# Patient Record
Sex: MALE | Race: WHITE | NOT HISPANIC OR LATINO | Employment: FULL TIME | URBAN - METROPOLITAN AREA
[De-identification: names, ages, dates, MRNs, and addresses within clinical notes are randomized per-mention and may not be internally consistent; named-entity substitution may affect disease eponyms.]

---

## 2017-08-27 ENCOUNTER — APPOINTMENT (OUTPATIENT)
Dept: RADIOLOGY | Facility: MEDICAL CENTER | Age: 27
End: 2017-08-27
Attending: EMERGENCY MEDICINE
Payer: COMMERCIAL

## 2017-08-27 ENCOUNTER — HOSPITAL ENCOUNTER (EMERGENCY)
Facility: MEDICAL CENTER | Age: 27
End: 2017-08-27
Attending: EMERGENCY MEDICINE
Payer: COMMERCIAL

## 2017-08-27 VITALS
HEART RATE: 76 BPM | OXYGEN SATURATION: 98 % | TEMPERATURE: 99.5 F | DIASTOLIC BLOOD PRESSURE: 84 MMHG | SYSTOLIC BLOOD PRESSURE: 123 MMHG | BODY MASS INDEX: 24.31 KG/M2 | HEIGHT: 66 IN | WEIGHT: 151.24 LBS | RESPIRATION RATE: 16 BRPM

## 2017-08-27 DIAGNOSIS — R10.11 RUQ PAIN: ICD-10-CM

## 2017-08-27 LAB
ALBUMIN SERPL BCP-MCNC: 4.6 G/DL (ref 3.2–4.9)
ALBUMIN/GLOB SERPL: 1.6 G/DL
ALP SERPL-CCNC: 58 U/L (ref 30–99)
ALT SERPL-CCNC: 22 U/L (ref 2–50)
ANION GAP SERPL CALC-SCNC: 9 MMOL/L (ref 0–11.9)
AST SERPL-CCNC: 18 U/L (ref 12–45)
BASOPHILS # BLD AUTO: 0.5 % (ref 0–1.8)
BASOPHILS # BLD: 0.03 K/UL (ref 0–0.12)
BILIRUB SERPL-MCNC: 2.1 MG/DL (ref 0.1–1.5)
BUN SERPL-MCNC: 13 MG/DL (ref 8–22)
CALCIUM SERPL-MCNC: 9.6 MG/DL (ref 8.5–10.5)
CHLORIDE SERPL-SCNC: 107 MMOL/L (ref 96–112)
CO2 SERPL-SCNC: 25 MMOL/L (ref 20–33)
CREAT SERPL-MCNC: 0.77 MG/DL (ref 0.5–1.4)
EOSINOPHIL # BLD AUTO: 0.02 K/UL (ref 0–0.51)
EOSINOPHIL NFR BLD: 0.3 % (ref 0–6.9)
ERYTHROCYTE [DISTWIDTH] IN BLOOD BY AUTOMATED COUNT: 41 FL (ref 35.9–50)
GFR SERPL CREATININE-BSD FRML MDRD: >60 ML/MIN/1.73 M 2
GLOBULIN SER CALC-MCNC: 2.9 G/DL (ref 1.9–3.5)
GLUCOSE SERPL-MCNC: 114 MG/DL (ref 65–99)
HCT VFR BLD AUTO: 43.8 % (ref 42–52)
HGB BLD-MCNC: 15 G/DL (ref 14–18)
IMM GRANULOCYTES # BLD AUTO: 0.02 K/UL (ref 0–0.11)
IMM GRANULOCYTES NFR BLD AUTO: 0.3 % (ref 0–0.9)
LIPASE SERPL-CCNC: 7 U/L (ref 11–82)
LYMPHOCYTES # BLD AUTO: 2.47 K/UL (ref 1–4.8)
LYMPHOCYTES NFR BLD: 37.8 % (ref 22–41)
MCH RBC QN AUTO: 29.6 PG (ref 27–33)
MCHC RBC AUTO-ENTMCNC: 34.2 G/DL (ref 33.7–35.3)
MCV RBC AUTO: 86.4 FL (ref 81.4–97.8)
MONOCYTES # BLD AUTO: 0.64 K/UL (ref 0–0.85)
MONOCYTES NFR BLD AUTO: 9.8 % (ref 0–13.4)
NEUTROPHILS # BLD AUTO: 3.36 K/UL (ref 1.82–7.42)
NEUTROPHILS NFR BLD: 51.3 % (ref 44–72)
NRBC # BLD AUTO: 0 K/UL
NRBC BLD AUTO-RTO: 0 /100 WBC
PLATELET # BLD AUTO: 307 K/UL (ref 164–446)
PMV BLD AUTO: 10.5 FL (ref 9–12.9)
POTASSIUM SERPL-SCNC: 3.8 MMOL/L (ref 3.6–5.5)
PROT SERPL-MCNC: 7.5 G/DL (ref 6–8.2)
RBC # BLD AUTO: 5.07 M/UL (ref 4.7–6.1)
SODIUM SERPL-SCNC: 141 MMOL/L (ref 135–145)
WBC # BLD AUTO: 6.5 K/UL (ref 4.8–10.8)

## 2017-08-27 PROCEDURE — 85025 COMPLETE CBC W/AUTO DIFF WBC: CPT

## 2017-08-27 PROCEDURE — 76705 ECHO EXAM OF ABDOMEN: CPT

## 2017-08-27 PROCEDURE — 80053 COMPREHEN METABOLIC PANEL: CPT

## 2017-08-27 PROCEDURE — 99283 EMERGENCY DEPT VISIT LOW MDM: CPT

## 2017-08-27 PROCEDURE — 83690 ASSAY OF LIPASE: CPT

## 2017-08-27 RX ORDER — PANTOPRAZOLE SODIUM 40 MG/1
40 FOR SUSPENSION ORAL DAILY
COMMUNITY

## 2017-08-27 RX ORDER — FAMOTIDINE 20 MG/1
20 TABLET, FILM COATED ORAL
COMMUNITY

## 2017-08-27 ASSESSMENT — PAIN SCALES - GENERAL: PAINLEVEL_OUTOF10: 2

## 2017-08-27 NOTE — ED NOTES
"Pt states that he has hx of GERD, states that his PMD placed him on pantoprazole, states that he started having yellow stools and constipation, states that he has not been constipated over last two days but continues to have yellow stools, pt states that he is not really hurting in RUQ \"it's just a little tender\", pt ambulated to room with strong steady gait in NAD at this time.  "

## 2017-08-27 NOTE — ED PROVIDER NOTES
ED Provider Note    Scribed for Ramos Agee M.D. by Amanda Hernandez. 8/27/2017, 4:50 PM.    Primary care provider: None noted.   Means of arrival: Walk-in   History obtained from: Patient   History limited by: None     CHIEF COMPLAINT  Chief Complaint   Patient presents with   • RUQ Pain     on and off since friday, denies n/v/d or cold chills       HPI  Stewart Fernandez is a 26 y.o. male who presents to the Emergency Department due to worsening intermittent right upper quadrant pain onset 2 days ago. His pain is described as a dull sensation and is rated as a 2/10 in severity. Patient was evaluated due to headaches and GERD symptoms previously, and he was given pantoprazole, Tylenol, and ibuprofen. He notes a history of acid reflux, GERD, and liver issues. He started using the pantoprazole and began experiencing yellow stools and intermittent constipation. He denies yellowing to the eyes or skin. Patient notes mild intermittent blurry vision at onset of his headache symptoms. He is a former smoker. He notes drinking 2 to 3 beers or glasses of wine each night with dinner, Sometimes more. He notes prior occasional illicit drug use, although he has not used any drugs for several months. Patient denies fever, chills, nausea, or vomiting.     REVIEW OF SYSTEMS  See HPI for further details. All other systems negative. C     PAST MEDICAL HISTORY   has a past medical history of GERD (gastroesophageal reflux disease).    SURGICAL HISTORY  patient denies any surgical history    SOCIAL HISTORY  Social History   Substance Use Topics   • Smoking status: Former Smoker     Start date: 6/27/2017   • Smokeless tobacco: Never Used   • Alcohol use Yes      Comment: 5 out of 7 days      History   Drug Use No       FAMILY HISTORY  History reviewed. No pertinent family history.    CURRENT MEDICATIONS  Home Medications     Reviewed by Tita Jimenez R.N. (Registered Nurse) on 08/27/17 at 1600  Med List Status: Complete   Medication  "Last Dose Status   famotidine (PEPCID) 20 MG Tab 8/26/2017 Active   pantoprazole (PROTONIX) 40 MG Pack 8/27/2017 Active                ALLERGIES  No Known Allergies    PHYSICAL EXAM  VITAL SIGNS: /80   Pulse 83   Temp 37.5 °C (99.5 °F) (Temporal)   Resp 14   Ht 1.676 m (5' 6\")   Wt 68.6 kg (151 lb 3.8 oz)   SpO2 96%   BMI 24.41 kg/m²   Vitals reviewed.  Pulse ox interpretation: I interpret this pulse ox as normal.  Constitutional: Alert in no apparent distress.  HENT: No signs of trauma, Bilateral external ears normal, Nose normal.   Eyes: Pupils are equal and reactive, Conjunctiva normal, Non-icteric.   Neck: Normal range of motion, No tenderness, Supple, No stridor.   Lymphatic: No lymphadenopathy noted.   Cardiovascular: Regular rate and rhythm, no murmurs.   Thorax & Lungs: Normal breath sounds, No respiratory distress, No wheezing, No chest tenderness.   Abdomen: Bowel sounds normal, Soft, Tender to palpation of the right upper quadrant, Negative Espinoza'sSign, No masses, No pulsatile masses. No peritoneal signs.  Skin: Warm, Dry, No erythema, No rash.   Back: No bony tenderness, No CVA tenderness.   Extremities: Intact distal pulses, No edema, No tenderness, No cyanosis.  Musculoskeletal: Good range of motion in all major joints. No tenderness to palpation or major deformities noted.   Neurologic: Alert , Normal motor function, Normal sensory function, No focal deficits noted.   Psychiatric: Affect normal, Judgment normal, Mood normal.     DIAGNOSTIC STUDIES / PROCEDURES    LABS  Labs Reviewed   COMP METABOLIC PANEL - Abnormal; Notable for the following:        Result Value    Glucose 114 (*)     Total Bilirubin 2.1 (*)     All other components within normal limits   LIPASE - Abnormal; Notable for the following:     Lipase 7 (*)     All other components within normal limits   CBC WITH DIFFERENTIAL   ESTIMATED GFR    All labs reviewed by me.    RADIOLOGY  US-LIVER AND BILIARY TREE   Final Result    "   1.  No evidence of gallstone or biliary ductal dilatation.      2.  3 mm gallbladder polyp.      3.  The liver is echogenic consistent with fatty change versus hepatocellular dysfunction.      The radiologist's interpretation of all radiological studies have been reviewed by me.    COURSE & MEDICAL DECISION MAKING  Nursing notes, GUERDA HOOVERHx reviewed in chart.  This is a 26-year-old male here with right upper quadrant pain. Differential diagnoses include but not limited to: gastritis, constipation, viral hepatitis, alcoholic hepatitis, medication side effect, cholecystitis, choledocholithiasis, pancreatitis.     4:50 PM Patient seen and examined at bedside. Patient appears well-hydrated and nontoxic. Physical exam is remarkable for mild tenderness to palpation in the right upper quadrant with a negative Espinoza's sign. Ordered for ultrasound liver and biliary tree, CBC with differential, CMP, lipase, and estimated GFR to evaluate.     Patient's labs reveal mildly elevated bilirubin to 2.1 which is unlikely to be clinically significant. Remainder of labs are unremarkable.    7:19 PM Recheck: Patient is resting comfortably and reports feeling improved. I updated him on his results, which are indicated above. There is no evidence of gallstones. I explained that he is now stable for discharge home. I advised him to follow up with his primary care provider as soon as he returns home to [place] and to return to the ED for worsening symptoms. I also discussed considering decreasing his alcohol intake. He reported that he was already considering that on his own. He understands and will comply.     The patient will return for new or worsening symptoms and is stable at the time of discharge.    The patient is referred to a primary physician for blood pressure management, diabetic screening, and for all other preventative health concerns.    DISPOSITION:  Patient will be discharged home in stable condition.    FOLLOW UP:  Your  physician when you return home to [place]    Go in 1 week  for re-check      OUTPATIENT MEDICATIONS:  New Prescriptions    No medications on file         FINAL IMPRESSION  1. RUQ pain          Amanda CARRASCO (Scribe), am scribing for, and in the presence of, Ramos Agee M.D..    Electronically signed by: Amanda Hernandez (Scribe), 8/27/2017    IRamos M.D. personally performed the services described in this documentation, as scribed by Amanda Hernandez in my presence, and it is both accurate and complete.    The note accurately reflects work and decisions made by me.  Ramos Agee  8/27/2017  9:08 PM

## 2017-08-27 NOTE — ED NOTES
Stewart Fernandez  26 y.o.  Chief Complaint   Patient presents with   • RUQ Pain     on and off since friday, denies n/v/d or cold chills     Pt sates that he was told by his Dr to get it checked out if it got worse.

## 2017-08-28 NOTE — DISCHARGE INSTRUCTIONS
You were seen in the ER for abdominal pain. Your labs only revealed a mildly elevated bilirubin and your ultrasound suggested liver changes most likely from your alcohol use. You do not require further workup, consultation, or admission to the hospital. You are safe to go home. Please consider decreasing your alcohol intake. Please follow up with her primary care physician when you return home to [place]. Return to the ER with any new or worsening symptoms.    Abdominal Pain (Nonspecific)  Your exam might not show the exact reason you have abdominal pain. Since there are many different causes of abdominal pain, another checkup and more tests may be needed. It is very important to follow up for lasting (persistent) or worsening symptoms. A possible cause of abdominal pain in any person who still has his or her appendix is acute appendicitis. Appendicitis is often hard to diagnose. Normal blood tests, urine tests, ultrasound, and CT scans do not completely rule out early appendicitis or other causes of abdominal pain. Sometimes, only the changes that happen over time will allow appendicitis and other causes of abdominal pain to be determined. Other potential problems that may require surgery may also take time to become more apparent. Because of this, it is important that you follow all of the instructions below.  HOME CARE INSTRUCTIONS   · Rest as much as possible.   · Do not eat solid food until your pain is gone.   · While adults or children have pain: A diet of water, weak decaffeinated tea, broth or bouillon, gelatin, oral rehydration solutions (ORS), frozen ice pops, or ice chips may be helpful.   · When pain is gone in adults or children: Start a light diet (dry toast, crackers, applesauce, or white rice). Increase the diet slowly as long as it does not bother you. Eat no dairy products (including cheese and eggs) and no spicy, fatty, fried, or high-fiber foods.   · Use no alcohol, caffeine, or cigarettes.    · Take your regular medicines unless your caregiver told you not to.   · Take any prescribed medicine as directed.   · Only take over-the-counter or prescription medicines for pain, discomfort, or fever as directed by your caregiver. Do not give aspirin to children.   If your caregiver has given you a follow-up appointment, it is very important to keep that appointment. Not keeping the appointment could result in a permanent injury and/or lasting (chronic) pain and/or disability. If there is any problem keeping the appointment, you must call to reschedule.   SEEK IMMEDIATE MEDICAL CARE IF:   · Your pain is not gone in 24 hours.   · Your pain becomes worse, changes location, or feels different.   · You or your child has an oral temperature above 102° F (38.9° C), not controlled by medicine.   · Your baby is older than 3 months with a rectal temperature of 102° F (38.9° C) or higher.   · Your baby is 3 months old or younger with a rectal temperature of 100.4° F (38° C) or higher.   · You have shaking chills.   · You keep throwing up (vomiting) or cannot drink liquids.   · There is blood in your vomit or you see blood in your bowel movements.   · Your bowel movements become dark or black.   · You have frequent bowel movements.   · Your bowel movements stop (become blocked) or you cannot pass gas.   · You have bloody, frequent, or painful urination.   · You have yellow discoloration in the skin or whites of the eyes.   · Your stomach becomes bloated or bigger.   · You have dizziness or fainting.   · You have chest or back pain.   MAKE SURE YOU:   · Understand these instructions.   · Will watch your condition.   · Will get help right away if you are not doing well or get worse.   Document Released: 12/18/2006 Document Revised: 03/11/2013 Document Reviewed: 11/15/2010  [employer]® Patient Information ©2013 [employer].